# Patient Record
Sex: MALE | URBAN - METROPOLITAN AREA
[De-identification: names, ages, dates, MRNs, and addresses within clinical notes are randomized per-mention and may not be internally consistent; named-entity substitution may affect disease eponyms.]

---

## 2020-11-24 ENCOUNTER — APPOINTMENT (RX ONLY)
Dept: URBAN - METROPOLITAN AREA CLINIC 292 | Facility: CLINIC | Age: 75
Setting detail: DERMATOLOGY
End: 2020-11-24

## 2020-11-24 VITALS — TEMPERATURE: 98.5 F

## 2020-11-24 DIAGNOSIS — L82.1 OTHER SEBORRHEIC KERATOSIS: ICD-10-CM

## 2020-11-24 DIAGNOSIS — D485 NEOPLASM OF UNCERTAIN BEHAVIOR OF SKIN: ICD-10-CM

## 2020-11-24 DIAGNOSIS — D18.0 HEMANGIOMA: ICD-10-CM

## 2020-11-24 DIAGNOSIS — Z85.828 PERSONAL HISTORY OF OTHER MALIGNANT NEOPLASM OF SKIN: ICD-10-CM

## 2020-11-24 DIAGNOSIS — D22 MELANOCYTIC NEVI: ICD-10-CM

## 2020-11-24 DIAGNOSIS — L81.4 OTHER MELANIN HYPERPIGMENTATION: ICD-10-CM

## 2020-11-24 PROBLEM — D22.5 MELANOCYTIC NEVI OF TRUNK: Status: ACTIVE | Noted: 2020-11-24

## 2020-11-24 PROBLEM — D18.01 HEMANGIOMA OF SKIN AND SUBCUTANEOUS TISSUE: Status: ACTIVE | Noted: 2020-11-24

## 2020-11-24 PROBLEM — D48.5 NEOPLASM OF UNCERTAIN BEHAVIOR OF SKIN: Status: ACTIVE | Noted: 2020-11-24

## 2020-11-24 PROCEDURE — 99213 OFFICE O/P EST LOW 20 MIN: CPT | Mod: 25

## 2020-11-24 PROCEDURE — ? COUNSELING

## 2020-11-24 PROCEDURE — ? FULL BODY SKIN EXAM

## 2020-11-24 PROCEDURE — ? BIOPSY BY SHAVE METHOD

## 2020-11-24 PROCEDURE — ? SUNSCREEN RECOMMENDATIONS

## 2020-11-24 PROCEDURE — 11102 TANGNTL BX SKIN SINGLE LES: CPT

## 2020-11-24 ASSESSMENT — LOCATION SIMPLE DESCRIPTION DERM
LOCATION SIMPLE: LEFT UPPER BACK
LOCATION SIMPLE: CHEST
LOCATION SIMPLE: RIGHT FOREARM
LOCATION SIMPLE: UPPER BACK
LOCATION SIMPLE: LEFT FOREARM
LOCATION SIMPLE: ABDOMEN
LOCATION SIMPLE: TRAPEZIAL NECK
LOCATION SIMPLE: LEFT TEMPLE
LOCATION SIMPLE: RIGHT UPPER BACK

## 2020-11-24 ASSESSMENT — LOCATION DETAILED DESCRIPTION DERM
LOCATION DETAILED: RIGHT MEDIAL UPPER BACK
LOCATION DETAILED: LEFT CENTRAL TEMPLE
LOCATION DETAILED: LEFT MID-UPPER BACK
LOCATION DETAILED: RIGHT MEDIAL INFERIOR CHEST
LOCATION DETAILED: LEFT DISTAL DORSAL FOREARM
LOCATION DETAILED: EPIGASTRIC SKIN
LOCATION DETAILED: RIGHT PROXIMAL DORSAL FOREARM
LOCATION DETAILED: MID TRAPEZIAL NECK
LOCATION DETAILED: STERNUM
LOCATION DETAILED: INFERIOR THORACIC SPINE
LOCATION DETAILED: LEFT PROXIMAL RADIAL DORSAL FOREARM

## 2020-11-24 ASSESSMENT — LOCATION ZONE DERM
LOCATION ZONE: NECK
LOCATION ZONE: TRUNK
LOCATION ZONE: ARM
LOCATION ZONE: FACE

## 2020-11-24 NOTE — PROCEDURE: BIOPSY BY SHAVE METHOD
Body Location Override (Optional - Billing Will Still Be Based On Selected Body Map Location If Applicable): left temple
Detail Level: Detailed
Depth Of Biopsy: dermis
Was A Bandage Applied: Yes
Size Of Lesion In Cm: 0.5
X Size Of Lesion In Cm: 0.7
Anticipated Plan (Based On Presumed Biopsy Results): Excision
Biopsy Type: H and E
Biopsy Method: double edge Personna blade
Anesthesia Type: 1% lidocaine with epinephrine
Additional Anesthesia Volume In Cc (Will Not Render If 0): 0
Hemostasis: Pablo's
Wound Care: Petrolatum
Dressing: bandage
Destruction After The Procedure: No
Type Of Destruction Used: Curettage
Curettage Text: The wound bed was treated with curettage after the biopsy was performed.
Cryotherapy Text: The wound bed was treated with cryotherapy after the biopsy was performed.
Electrodesiccation Text: The wound bed was treated with electrodesiccation after the biopsy was performed.
Electrodesiccation And Curettage Text: The wound bed was treated with electrodesiccation and curettage after the biopsy was performed.
Silver Nitrate Text: The wound bed was treated with silver nitrate after the biopsy was performed.
Lab: [id number]
Lab Facility: [id number]
Consent: Written consent was obtained and risks were reviewed including but not limited to scarring, infection, bleeding, scabbing, incomplete removal, nerve damage and allergy to anesthesia.
Post-Care Instructions: I reviewed with the patient in detail post-care instructions. Patient is to keep the biopsy site dry overnight, and then apply vaseline twice daily until healed. Patient may wash after 24 hours with mild soap and water.
Notification Instructions: Patient will be notified of biopsy results. However, patient instructed to call the office if not contacted within 2 weeks.
Billing Type: Third-Party Bill
Information: Selecting Yes will display possible errors in your note based on the variables you have selected. This validation is only offered as a suggestion for you. PLEASE NOTE THAT THE VALIDATION TEXT WILL BE REMOVED WHEN YOU FINALIZE YOUR NOTE. IF YOU WANT TO FAX A PRELIMINARY NOTE YOU WILL NEED TO TOGGLE THIS TO 'NO' IF YOU DO NOT WANT IT IN YOUR FAXED NOTE.

## 2023-05-03 ENCOUNTER — APPOINTMENT (RX ONLY)
Dept: URBAN - METROPOLITAN AREA CLINIC 292 | Facility: CLINIC | Age: 78
Setting detail: DERMATOLOGY
End: 2023-05-03

## 2023-05-03 DIAGNOSIS — D22 MELANOCYTIC NEVI: ICD-10-CM | Status: STABLE

## 2023-05-03 DIAGNOSIS — D485 NEOPLASM OF UNCERTAIN BEHAVIOR OF SKIN: ICD-10-CM

## 2023-05-03 DIAGNOSIS — L57.0 ACTINIC KERATOSIS: ICD-10-CM

## 2023-05-03 DIAGNOSIS — D18.0 HEMANGIOMA: ICD-10-CM | Status: STABLE

## 2023-05-03 DIAGNOSIS — L82.1 OTHER SEBORRHEIC KERATOSIS: ICD-10-CM | Status: STABLE

## 2023-05-03 DIAGNOSIS — Z85.828 PERSONAL HISTORY OF OTHER MALIGNANT NEOPLASM OF SKIN: ICD-10-CM | Status: STABLE

## 2023-05-03 DIAGNOSIS — L81.4 OTHER MELANIN HYPERPIGMENTATION: ICD-10-CM | Status: STABLE

## 2023-05-03 DIAGNOSIS — Z85.820 PERSONAL HISTORY OF MALIGNANT MELANOMA OF SKIN: ICD-10-CM | Status: UNCHANGED

## 2023-05-03 PROBLEM — D18.01 HEMANGIOMA OF SKIN AND SUBCUTANEOUS TISSUE: Status: ACTIVE | Noted: 2023-05-03

## 2023-05-03 PROBLEM — D22.5 MELANOCYTIC NEVI OF TRUNK: Status: ACTIVE | Noted: 2023-05-03

## 2023-05-03 PROBLEM — D48.5 NEOPLASM OF UNCERTAIN BEHAVIOR OF SKIN: Status: ACTIVE | Noted: 2023-05-03

## 2023-05-03 PROCEDURE — ? FULL BODY SKIN EXAM

## 2023-05-03 PROCEDURE — 99213 OFFICE O/P EST LOW 20 MIN: CPT | Mod: 25

## 2023-05-03 PROCEDURE — 11102 TANGNTL BX SKIN SINGLE LES: CPT

## 2023-05-03 PROCEDURE — ? LIQUID NITROGEN

## 2023-05-03 PROCEDURE — 17003 DESTRUCT PREMALG LES 2-14: CPT

## 2023-05-03 PROCEDURE — ? COUNSELING

## 2023-05-03 PROCEDURE — ? ADDITIONAL NOTES

## 2023-05-03 PROCEDURE — ? TREATMENT REGIMEN

## 2023-05-03 PROCEDURE — 11103 TANGNTL BX SKIN EA SEP/ADDL: CPT

## 2023-05-03 PROCEDURE — 17000 DESTRUCT PREMALG LESION: CPT | Mod: 59

## 2023-05-03 PROCEDURE — ? BIOPSY BY SHAVE METHOD

## 2023-05-03 ASSESSMENT — LOCATION DETAILED DESCRIPTION DERM
LOCATION DETAILED: LEFT ULNAR DORSAL HAND
LOCATION DETAILED: RIGHT DISTAL POSTERIOR THIGH
LOCATION DETAILED: LEFT MEDIAL FRONTAL SCALP
LOCATION DETAILED: LEFT MID-UPPER BACK
LOCATION DETAILED: RIGHT SUPERIOR FLANK
LOCATION DETAILED: LEFT DISTAL DORSAL FOREARM
LOCATION DETAILED: NASAL SUPRATIP
LOCATION DETAILED: LEFT SUPERIOR UPPER BACK
LOCATION DETAILED: EPIGASTRIC SKIN
LOCATION DETAILED: LEFT SUPERIOR LATERAL LOWER BACK
LOCATION DETAILED: RIGHT SUPERIOR UPPER BACK
LOCATION DETAILED: RIGHT SUPERIOR PARIETAL SCALP
LOCATION DETAILED: RIGHT RIB CAGE

## 2023-05-03 ASSESSMENT — LOCATION SIMPLE DESCRIPTION DERM
LOCATION SIMPLE: RIGHT POSTERIOR THIGH
LOCATION SIMPLE: LEFT LOWER BACK
LOCATION SIMPLE: LEFT FOREARM
LOCATION SIMPLE: ABDOMEN
LOCATION SIMPLE: RIGHT UPPER BACK
LOCATION SIMPLE: SCALP
LOCATION SIMPLE: LEFT SCALP
LOCATION SIMPLE: LEFT HAND
LOCATION SIMPLE: LEFT UPPER BACK
LOCATION SIMPLE: NOSE

## 2023-05-03 ASSESSMENT — LOCATION ZONE DERM
LOCATION ZONE: TRUNK
LOCATION ZONE: HAND
LOCATION ZONE: NOSE
LOCATION ZONE: SCALP
LOCATION ZONE: LEG
LOCATION ZONE: ARM

## 2023-05-03 NOTE — HPI: EVALUATION OF SKIN LESION(S)
What Type Of Note Output Would You Prefer (Optional)?: Bullet Format
Hpi Title: Evaluation of Skin Lesions
How Severe Are Your Spot(S)?: mild
Have Your Spot(S) Been Treated In The Past?: has not been treated
Additional History: FBE\\nSpot on right hand and left temple

## 2023-05-03 NOTE — PROCEDURE: ADDITIONAL NOTES
Detail Level: Simple
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 Gray \\nNo skin checks since that time\\nNo lymph nodes sampled\\n\\nNo LAD on exam
Render Risk Assessment In Note?: no

## 2023-05-03 NOTE — PROCEDURE: LIQUID NITROGEN
Number Of Freeze-Thaw Cycles: 1 freeze-thaw cycle
Render Post-Care Instructions In Note?: no
Consent: The patient's consent was obtained including but not limited to risks of crusting, scabbing, blistering, scarring, darker or lighter pigmentary change, recurrence, incomplete removal and infection.
Show Aperture Variable?: Yes
Application Tool (Optional): Cry-AC
Detail Level: Detailed
Post-Care Instructions: I reviewed with the patient in detail post-care instructions. Patient is to wear sunprotection, and avoid picking at any of the treated lesions. Pt may apply Vaseline to crusted or scabbing areas.
Duration Of Freeze Thaw-Cycle (Seconds): 1

## 2023-05-03 NOTE — PROCEDURE: FULL BODY SKIN EXAM
Drink plenty of fluids.  Lie down immediately if you feel dizzy.  Rise from a seated position very slowly.  Continue normal medications.  Follow-up with the recommended cardiologist to have your monitor interpreted.  Return to the ER or call 911 for any changes or worsening or new problems.    
Price (Do Not Change): 0.00
Detail Level: Simple
Instructions: This plan will send the code FBSE to the PM system.  DO NOT or CHANGE the price.

## 2023-09-05 ENCOUNTER — APPOINTMENT (RX ONLY)
Dept: URBAN - METROPOLITAN AREA CLINIC 292 | Facility: CLINIC | Age: 78
Setting detail: DERMATOLOGY
End: 2023-09-05

## 2023-09-05 DIAGNOSIS — D22 MELANOCYTIC NEVI: ICD-10-CM | Status: STABLE

## 2023-09-05 DIAGNOSIS — Z85.820 PERSONAL HISTORY OF MALIGNANT MELANOMA OF SKIN: ICD-10-CM | Status: UNCHANGED

## 2023-09-05 DIAGNOSIS — L82.1 OTHER SEBORRHEIC KERATOSIS: ICD-10-CM | Status: STABLE

## 2023-09-05 DIAGNOSIS — D18.0 HEMANGIOMA: ICD-10-CM | Status: STABLE

## 2023-09-05 PROBLEM — D18.01 HEMANGIOMA OF SKIN AND SUBCUTANEOUS TISSUE: Status: ACTIVE | Noted: 2023-09-05

## 2023-09-05 PROBLEM — D22.5 MELANOCYTIC NEVI OF TRUNK: Status: ACTIVE | Noted: 2023-09-05

## 2023-09-05 PROCEDURE — ? COUNSELING

## 2023-09-05 PROCEDURE — 99213 OFFICE O/P EST LOW 20 MIN: CPT

## 2023-09-05 PROCEDURE — ? TREATMENT REGIMEN

## 2023-09-05 PROCEDURE — ? DIAGNOSIS COMMENT

## 2023-09-05 ASSESSMENT — LOCATION DETAILED DESCRIPTION DERM
LOCATION DETAILED: RIGHT SUPERIOR LATERAL MIDBACK
LOCATION DETAILED: LEFT INFERIOR UPPER BACK
LOCATION DETAILED: LEFT SUPERIOR MEDIAL UPPER BACK
LOCATION DETAILED: LEFT PROXIMAL DORSAL FOREARM

## 2023-09-05 ASSESSMENT — LOCATION SIMPLE DESCRIPTION DERM
LOCATION SIMPLE: RIGHT LOWER BACK
LOCATION SIMPLE: LEFT FOREARM
LOCATION SIMPLE: LEFT UPPER BACK

## 2023-09-05 ASSESSMENT — LOCATION ZONE DERM
LOCATION ZONE: ARM
LOCATION ZONE: TRUNK

## 2023-09-05 NOTE — PROCEDURE: DIAGNOSIS COMMENT
Detail Level: Simple
Render Risk Assessment In Note?: no
Comment: Patient had WLE on left FA 5 or more years ago. Well healed today.

## 2024-03-05 ENCOUNTER — APPOINTMENT (RX ONLY)
Dept: URBAN - METROPOLITAN AREA CLINIC 292 | Facility: CLINIC | Age: 79
Setting detail: DERMATOLOGY
End: 2024-03-05

## 2024-03-05 DIAGNOSIS — L81.4 OTHER MELANIN HYPERPIGMENTATION: ICD-10-CM

## 2024-03-05 DIAGNOSIS — D485 NEOPLASM OF UNCERTAIN BEHAVIOR OF SKIN: ICD-10-CM

## 2024-03-05 DIAGNOSIS — Z85.820 PERSONAL HISTORY OF MALIGNANT MELANOMA OF SKIN: ICD-10-CM

## 2024-03-05 DIAGNOSIS — Z85.828 PERSONAL HISTORY OF OTHER MALIGNANT NEOPLASM OF SKIN: ICD-10-CM

## 2024-03-05 DIAGNOSIS — L57.0 ACTINIC KERATOSIS: ICD-10-CM

## 2024-03-05 DIAGNOSIS — D22 MELANOCYTIC NEVI: ICD-10-CM

## 2024-03-05 DIAGNOSIS — L82.1 OTHER SEBORRHEIC KERATOSIS: ICD-10-CM

## 2024-03-05 PROBLEM — D48.5 NEOPLASM OF UNCERTAIN BEHAVIOR OF SKIN: Status: ACTIVE | Noted: 2024-03-05

## 2024-03-05 PROBLEM — D22.5 MELANOCYTIC NEVI OF TRUNK: Status: ACTIVE | Noted: 2024-03-05

## 2024-03-05 PROCEDURE — ? FULL BODY SKIN EXAM

## 2024-03-05 PROCEDURE — 99213 OFFICE O/P EST LOW 20 MIN: CPT | Mod: 25

## 2024-03-05 PROCEDURE — ? PHOTO-DOCUMENTATION

## 2024-03-05 PROCEDURE — ? ADDITIONAL NOTES

## 2024-03-05 PROCEDURE — 17000 DESTRUCT PREMALG LESION: CPT | Mod: 59

## 2024-03-05 PROCEDURE — ? LIQUID NITROGEN

## 2024-03-05 PROCEDURE — ? TREATMENT REGIMEN

## 2024-03-05 PROCEDURE — 11102 TANGNTL BX SKIN SINGLE LES: CPT

## 2024-03-05 PROCEDURE — 17003 DESTRUCT PREMALG LES 2-14: CPT

## 2024-03-05 PROCEDURE — ? BIOPSY BY SHAVE METHOD

## 2024-03-05 PROCEDURE — ? COUNSELING

## 2024-03-05 ASSESSMENT — LOCATION DETAILED DESCRIPTION DERM
LOCATION DETAILED: LEFT INFERIOR UPPER BACK
LOCATION DETAILED: RIGHT DISTAL DORSAL FOREARM
LOCATION DETAILED: RIGHT PROXIMAL DORSAL FOREARM
LOCATION DETAILED: LEFT SUPERIOR UPPER BACK
LOCATION DETAILED: RIGHT RIB CAGE
LOCATION DETAILED: RIGHT SUPERIOR LATERAL UPPER BACK

## 2024-03-05 ASSESSMENT — LOCATION SIMPLE DESCRIPTION DERM
LOCATION SIMPLE: RIGHT UPPER BACK
LOCATION SIMPLE: LEFT UPPER BACK
LOCATION SIMPLE: ABDOMEN
LOCATION SIMPLE: RIGHT FOREARM

## 2024-03-05 ASSESSMENT — LOCATION ZONE DERM
LOCATION ZONE: TRUNK
LOCATION ZONE: ARM

## 2024-03-05 NOTE — PROCEDURE: ADDITIONAL NOTES
Render Risk Assessment In Note?: no
Additional Notes: MM on the left wrist s/p WLE >20 years ago.
Detail Level: Simple

## 2024-03-05 NOTE — HPI: EVALUATION OF SKIN LESION(S)
What Type Of Note Output Would You Prefer (Optional)?: Standard Output
Hpi Title: Evaluation of Skin Lesions
How Severe Are Your Spot(S)?: mild
Have Your Spot(S) Been Treated In The Past?: has not been treated
Location: Left wrist
Year Removed: 2000

## 2024-03-05 NOTE — PROCEDURE: LIQUID NITROGEN
Render Note In Bullet Format When Appropriate: No
Detail Level: Detailed
Show Aperture Variable?: Yes
Application Tool (Optional): Cry-AC
Post-Care Instructions: I reviewed with the patient in detail post-care instructions. Patient is to wear sunprotection, and avoid picking at any of the treated lesions. Pt may apply Vaseline to crusted or scabbing areas.
Duration Of Freeze Thaw-Cycle (Seconds): 1
Number Of Freeze-Thaw Cycles: 1 freeze-thaw cycle
Consent: The patient's consent was obtained including but not limited to risks of crusting, scabbing, blistering, scarring, darker or lighter pigmentary change, recurrence, incomplete removal and infection.